# Patient Record
Sex: MALE | Race: WHITE | NOT HISPANIC OR LATINO | Employment: FULL TIME | URBAN - METROPOLITAN AREA
[De-identification: names, ages, dates, MRNs, and addresses within clinical notes are randomized per-mention and may not be internally consistent; named-entity substitution may affect disease eponyms.]

---

## 2018-07-12 ENCOUNTER — OFFICE VISIT CONVERTED (OUTPATIENT)
Dept: FAMILY MEDICINE CLINIC | Facility: CLINIC | Age: 23
End: 2018-07-12
Attending: FAMILY MEDICINE

## 2018-09-18 ENCOUNTER — OFFICE VISIT CONVERTED (OUTPATIENT)
Dept: FAMILY MEDICINE CLINIC | Facility: CLINIC | Age: 23
End: 2018-09-18
Attending: NURSE PRACTITIONER

## 2018-09-24 ENCOUNTER — OFFICE VISIT CONVERTED (OUTPATIENT)
Dept: FAMILY MEDICINE CLINIC | Facility: CLINIC | Age: 23
End: 2018-09-24
Attending: FAMILY MEDICINE

## 2018-10-08 ENCOUNTER — OFFICE VISIT CONVERTED (OUTPATIENT)
Dept: FAMILY MEDICINE CLINIC | Facility: CLINIC | Age: 23
End: 2018-10-08
Attending: NURSE PRACTITIONER

## 2018-10-16 ENCOUNTER — CONVERSION ENCOUNTER (OUTPATIENT)
Dept: PODIATRY | Facility: CLINIC | Age: 23
End: 2018-10-16

## 2018-10-16 ENCOUNTER — OFFICE VISIT CONVERTED (OUTPATIENT)
Dept: PODIATRY | Facility: CLINIC | Age: 23
End: 2018-10-16
Attending: PODIATRIST

## 2018-10-22 ENCOUNTER — OFFICE VISIT CONVERTED (OUTPATIENT)
Dept: FAMILY MEDICINE CLINIC | Facility: CLINIC | Age: 23
End: 2018-10-22
Attending: NURSE PRACTITIONER

## 2018-10-30 ENCOUNTER — OFFICE VISIT CONVERTED (OUTPATIENT)
Dept: PODIATRY | Facility: CLINIC | Age: 23
End: 2018-10-30
Attending: PODIATRIST

## 2019-02-15 ENCOUNTER — OFFICE VISIT CONVERTED (OUTPATIENT)
Dept: FAMILY MEDICINE CLINIC | Facility: CLINIC | Age: 24
End: 2019-02-15
Attending: NURSE PRACTITIONER

## 2019-03-07 ENCOUNTER — CONVERSION ENCOUNTER (OUTPATIENT)
Dept: FAMILY MEDICINE CLINIC | Facility: CLINIC | Age: 24
End: 2019-03-07

## 2019-03-07 ENCOUNTER — OFFICE VISIT CONVERTED (OUTPATIENT)
Dept: FAMILY MEDICINE CLINIC | Facility: CLINIC | Age: 24
End: 2019-03-07
Attending: FAMILY MEDICINE

## 2019-07-23 ENCOUNTER — OFFICE VISIT CONVERTED (OUTPATIENT)
Dept: FAMILY MEDICINE CLINIC | Facility: CLINIC | Age: 24
End: 2019-07-23
Attending: FAMILY MEDICINE

## 2019-09-19 ENCOUNTER — OFFICE VISIT CONVERTED (OUTPATIENT)
Dept: FAMILY MEDICINE CLINIC | Facility: CLINIC | Age: 24
End: 2019-09-19
Attending: FAMILY MEDICINE

## 2019-10-07 ENCOUNTER — OFFICE VISIT CONVERTED (OUTPATIENT)
Dept: PODIATRY | Facility: CLINIC | Age: 24
End: 2019-10-07
Attending: PODIATRIST

## 2020-01-06 ENCOUNTER — CONVERSION ENCOUNTER (OUTPATIENT)
Dept: FAMILY MEDICINE CLINIC | Facility: CLINIC | Age: 25
End: 2020-01-06

## 2020-01-06 ENCOUNTER — OFFICE VISIT CONVERTED (OUTPATIENT)
Dept: FAMILY MEDICINE CLINIC | Facility: CLINIC | Age: 25
End: 2020-01-06
Attending: FAMILY MEDICINE

## 2021-05-09 VITALS
SYSTOLIC BLOOD PRESSURE: 130 MMHG | WEIGHT: 283.5 LBS | HEART RATE: 133 BPM | OXYGEN SATURATION: 98 % | TEMPERATURE: 98.8 F | DIASTOLIC BLOOD PRESSURE: 84 MMHG

## 2021-05-09 VITALS
DIASTOLIC BLOOD PRESSURE: 80 MMHG | WEIGHT: 287 LBS | HEART RATE: 82 BPM | OXYGEN SATURATION: 98 % | SYSTOLIC BLOOD PRESSURE: 120 MMHG

## 2021-05-09 VITALS
TEMPERATURE: 97.2 F | BODY MASS INDEX: 38.04 KG/M2 | WEIGHT: 287 LBS | SYSTOLIC BLOOD PRESSURE: 130 MMHG | OXYGEN SATURATION: 97 % | HEIGHT: 73 IN | DIASTOLIC BLOOD PRESSURE: 80 MMHG | HEART RATE: 101 BPM

## 2021-05-09 VITALS
SYSTOLIC BLOOD PRESSURE: 120 MMHG | HEART RATE: 94 BPM | WEIGHT: 283 LBS | DIASTOLIC BLOOD PRESSURE: 80 MMHG | TEMPERATURE: 97.4 F | OXYGEN SATURATION: 98 %

## 2021-05-09 VITALS
DIASTOLIC BLOOD PRESSURE: 86 MMHG | BODY MASS INDEX: 35.8 KG/M2 | HEART RATE: 124 BPM | WEIGHT: 270.12 LBS | SYSTOLIC BLOOD PRESSURE: 124 MMHG | TEMPERATURE: 98.2 F | OXYGEN SATURATION: 96 % | HEIGHT: 73 IN

## 2021-05-09 VITALS
TEMPERATURE: 98 F | HEART RATE: 104 BPM | OXYGEN SATURATION: 97 % | WEIGHT: 277.5 LBS | SYSTOLIC BLOOD PRESSURE: 116 MMHG | DIASTOLIC BLOOD PRESSURE: 78 MMHG

## 2021-05-09 VITALS
TEMPERATURE: 97.5 F | SYSTOLIC BLOOD PRESSURE: 118 MMHG | DIASTOLIC BLOOD PRESSURE: 80 MMHG | WEIGHT: 276 LBS | HEART RATE: 89 BPM | OXYGEN SATURATION: 97 %

## 2021-05-09 VITALS
TEMPERATURE: 97.3 F | DIASTOLIC BLOOD PRESSURE: 70 MMHG | SYSTOLIC BLOOD PRESSURE: 110 MMHG | OXYGEN SATURATION: 99 % | WEIGHT: 290 LBS | HEART RATE: 120 BPM

## 2021-05-09 VITALS
SYSTOLIC BLOOD PRESSURE: 124 MMHG | WEIGHT: 293.37 LBS | OXYGEN SATURATION: 98 % | TEMPERATURE: 98.2 F | HEART RATE: 93 BPM | DIASTOLIC BLOOD PRESSURE: 84 MMHG

## 2021-05-09 VITALS
WEIGHT: 289 LBS | HEART RATE: 90 BPM | TEMPERATURE: 97.2 F | DIASTOLIC BLOOD PRESSURE: 80 MMHG | OXYGEN SATURATION: 98 % | SYSTOLIC BLOOD PRESSURE: 120 MMHG

## 2021-05-15 VITALS
WEIGHT: 277 LBS | HEIGHT: 75 IN | SYSTOLIC BLOOD PRESSURE: 128 MMHG | DIASTOLIC BLOOD PRESSURE: 68 MMHG | HEART RATE: 69 BPM | BODY MASS INDEX: 34.44 KG/M2 | OXYGEN SATURATION: 99 %

## 2021-05-16 VITALS
DIASTOLIC BLOOD PRESSURE: 90 MMHG | WEIGHT: 291 LBS | BODY MASS INDEX: 36.18 KG/M2 | OXYGEN SATURATION: 100 % | HEART RATE: 151 BPM | HEIGHT: 75 IN | SYSTOLIC BLOOD PRESSURE: 140 MMHG

## 2021-05-16 VITALS
SYSTOLIC BLOOD PRESSURE: 140 MMHG | HEIGHT: 75 IN | HEART RATE: 74 BPM | DIASTOLIC BLOOD PRESSURE: 75 MMHG | BODY MASS INDEX: 35.43 KG/M2 | WEIGHT: 285 LBS | OXYGEN SATURATION: 99 %

## 2021-08-26 ENCOUNTER — OFFICE VISIT (OUTPATIENT)
Dept: FAMILY MEDICINE CLINIC | Facility: CLINIC | Age: 26
End: 2021-08-26

## 2021-08-26 VITALS
BODY MASS INDEX: 31.27 KG/M2 | SYSTOLIC BLOOD PRESSURE: 150 MMHG | WEIGHT: 237 LBS | HEART RATE: 75 BPM | DIASTOLIC BLOOD PRESSURE: 90 MMHG | TEMPERATURE: 96.5 F | OXYGEN SATURATION: 98 %

## 2021-08-26 DIAGNOSIS — L01.00 IMPETIGO: ICD-10-CM

## 2021-08-26 DIAGNOSIS — R03.0 ELEVATED BP WITHOUT DIAGNOSIS OF HYPERTENSION: Primary | ICD-10-CM

## 2021-08-26 DIAGNOSIS — L73.9 FOLLICULITIS: ICD-10-CM

## 2021-08-26 PROCEDURE — 99213 OFFICE O/P EST LOW 20 MIN: CPT | Performed by: FAMILY MEDICINE

## 2021-08-26 RX ORDER — CEPHALEXIN 500 MG/1
500 CAPSULE ORAL 4 TIMES DAILY
Qty: 28 CAPSULE | Refills: 0 | Status: SHIPPED | OUTPATIENT
Start: 2021-08-26 | End: 2021-09-02

## 2021-08-26 NOTE — PROGRESS NOTES
Chief Complaint  Rash (Rash x one month. )    Subjective          Noble Choudhary presents to [facility]  BP has been elevated today only- pt will recheck in 1 week    Rash  This is a new problem. The current episode started more than 1 month ago. The problem has been gradually worsening since onset. The rash is diffuse (over arms, abdomen, inguinal area). The rash is characterized by itchiness. He was exposed to nothing. Pertinent negatives include no anorexia, congestion, cough, diarrhea, eye pain, facial edema, fatigue, fever, joint pain, nail changes, rhinorrhea, shortness of breath, sore throat or vomiting. Treatments tried: aquaphor. The treatment provided no relief.       Objective   Allergies   Allergen Reactions   • Sulfamethoxazole-Trimethoprim Hives       There is no immunization history on file for this patient.    Vital Signs:   Vitals:    08/26/21 1648   BP: 150/90   Pulse: 75   Temp: 96.5 °F (35.8 °C)   SpO2: 98%   Weight: 108 kg (237 lb)       Physical Exam  Vitals reviewed. Exam conducted with a chaperone present.   Constitutional:       Appearance: Normal appearance. He is well-developed.   HENT:      Head: Normocephalic and atraumatic.      Right Ear: External ear normal.      Left Ear: External ear normal.      Mouth/Throat:      Pharynx: No oropharyngeal exudate.   Eyes:      Conjunctiva/sclera: Conjunctivae normal.      Pupils: Pupils are equal, round, and reactive to light.   Cardiovascular:      Rate and Rhythm: Normal rate and regular rhythm.      Pulses: Normal pulses.      Heart sounds: Normal heart sounds. No murmur heard.   No friction rub. No gallop.    Pulmonary:      Effort: Pulmonary effort is normal.      Breath sounds: Normal breath sounds. No wheezing or rhonchi.   Abdominal:      General: Bowel sounds are normal. There is no distension.      Palpations: Abdomen is soft.      Tenderness: There is no abdominal tenderness.   Skin:     General: Skin  is warm and dry.      Capillary Refill: Capillary refill takes less than 2 seconds.      Comments: Folliculitis of inguinal area and arms and abdomen only   Neurological:      Mental Status: He is alert and oriented to person, place, and time.      Cranial Nerves: No cranial nerve deficit.   Psychiatric:         Mood and Affect: Mood and affect normal.         Behavior: Behavior normal.         Thought Content: Thought content normal.         Judgment: Judgment normal.        Result Review :                 Assessment and Plan    Diagnoses and all orders for this visit:    1. Elevated BP without diagnosis of hypertension (Primary)    2. Folliculitis  -     cephalexin (Keflex) 500 MG capsule; Take 1 capsule by mouth 4 (Four) Times a Day for 7 days.  Dispense: 28 capsule; Refill: 0  -     mupirocin (Bactroban) 2 % ointment; Apply  topically to the appropriate area as directed 3 (Three) Times a Day.  Dispense: 30 g; Refill: 1    3. Impetigo  -     cephalexin (Keflex) 500 MG capsule; Take 1 capsule by mouth 4 (Four) Times a Day for 7 days.  Dispense: 28 capsule; Refill: 0  -     mupirocin (Bactroban) 2 % ointment; Apply  topically to the appropriate area as directed 3 (Three) Times a Day.  Dispense: 30 g; Refill: 1            Follow Up   Return in about 1 week (around 9/2/2021) for Recheck.  Patient was given instructions and counseling regarding his condition or for health maintenance advice. Please see specific information pulled into the AVS if appropriate.

## 2021-09-07 ENCOUNTER — TELEPHONE (OUTPATIENT)
Dept: FAMILY MEDICINE CLINIC | Facility: CLINIC | Age: 26
End: 2021-09-07

## 2021-09-07 DIAGNOSIS — R21 RASH: Primary | ICD-10-CM

## 2021-09-07 NOTE — TELEPHONE ENCOUNTER
Caller: Noble Choudhary    Relationship: Self    Best call back number: [phone]    What is the best time to reach you: ANYTIME    Who are you requesting to speak with (clinical staff, provider,  specific staff member): DR GORDON STAFF    Do you know the name of the person who called:     What was the call regarding: PATIENT STATES THAT HE WAS ON AN ANTIBIOTIC AND CREAM FOR RASH AND HE STATES THAT THE RASH HAS NOT IMPROVED. PATIENT STATES THAT THE ANTIBIOTIC RAN OUT ON Thursday. PLEASE CALL PATIENT TO ADVISE    [address] - [phone] [facility] [phone]   [phone]    Do you require a callback: YES

## 2021-09-09 PROCEDURE — 99213 OFFICE O/P EST LOW 20 MIN: CPT | Performed by: NURSE PRACTITIONER

## 2021-12-07 ENCOUNTER — OFFICE VISIT (OUTPATIENT)
Dept: FAMILY MEDICINE CLINIC | Facility: CLINIC | Age: 26
End: 2021-12-07

## 2021-12-07 VITALS
WEIGHT: 245 LBS | HEART RATE: 94 BPM | BODY MASS INDEX: 32.32 KG/M2 | OXYGEN SATURATION: 99 % | TEMPERATURE: 97.7 F | DIASTOLIC BLOOD PRESSURE: 78 MMHG | SYSTOLIC BLOOD PRESSURE: 110 MMHG

## 2021-12-07 DIAGNOSIS — S69.91XA INJURY OF RIGHT HAND, INITIAL ENCOUNTER: Primary | ICD-10-CM

## 2021-12-07 PROCEDURE — 99213 OFFICE O/P EST LOW 20 MIN: CPT | Performed by: FAMILY MEDICINE

## 2021-12-07 NOTE — PROGRESS NOTES
Chief Complaint    Hand Injury (PAIN AND SWELLING X2 DAYS )    Subjective      Noble Choudhary presents to [facility]     History of Present Illness    1.) INJURY OF RIGHT HAND : Occurred 2 days ago, while playing basketball. Patient reports a fall on an outstretched hand. He reports significant edema, which is not improving. He also reports pain of his 5th digits - history of previous fracture.    Objective      Vital Signs:     /78   Pulse 94   Temp 97.7 °F (36.5 °C)   Wt 111 kg (245 lb)   SpO2 99%   BMI 32.32 kg/m²       Physical Exam  Vitals reviewed.   Constitutional:       General: He is not in acute distress.     Appearance: Normal appearance. He is well-developed.   HENT:      Head: Normocephalic and atraumatic.      Right Ear: Hearing and external ear normal.      Left Ear: Hearing and external ear normal.      Nose: Nose normal.   Eyes:      General: Lids are normal.         Right eye: No discharge.         Left eye: No discharge.      Conjunctiva/sclera: Conjunctivae normal.   Pulmonary:      Effort: Pulmonary effort is normal.   Abdominal:      General: There is no distension.   Musculoskeletal:         General: No swelling.      Right hand: Swelling present.      Cervical back: Neck supple.      Comments: Slight discoloration. No significant tenderness with palpation.   Skin:     Coloration: Skin is not jaundiced.      Findings: No erythema.   Neurological:      Mental Status: He is alert. Mental status is at baseline.   Psychiatric:         Mood and Affect: Mood and affect normal.         Thought Content: Thought content normal.     Assessment and Plan     Diagnoses and all orders for this visit:    1. Injury of right hand, initial encounter (Primary)  Comments:  1.) X-ray completed and reviewed - ? trapezoid fracture - placed in brace - will await official radiology report - acetaminophen PRN.  Orders:  -     XR Hand 3+ View Right (In Office)    Patient was  given instructions and counseling regarding his condition or for health maintenance advice. Please see specific information pulled into the AVS if appropriate.

## 2021-12-08 DIAGNOSIS — S62.346S CLOSED NONDISPLACED FRACTURE OF BASE OF FIFTH METACARPAL BONE OF RIGHT HAND, SEQUELA: Primary | ICD-10-CM
